# Patient Record
Sex: MALE | Race: OTHER | HISPANIC OR LATINO | URBAN - METROPOLITAN AREA
[De-identification: names, ages, dates, MRNs, and addresses within clinical notes are randomized per-mention and may not be internally consistent; named-entity substitution may affect disease eponyms.]

---

## 2023-05-23 ENCOUNTER — OUTPATIENT (OUTPATIENT)
Dept: OUTPATIENT SERVICES | Facility: HOSPITAL | Age: 23
LOS: 1 days | End: 2023-05-23
Payer: MEDICAID

## 2023-05-23 DIAGNOSIS — R07.9 CHEST PAIN, UNSPECIFIED: ICD-10-CM

## 2023-05-23 PROCEDURE — 71046 X-RAY EXAM CHEST 2 VIEWS: CPT

## 2023-05-23 PROCEDURE — 71046 X-RAY EXAM CHEST 2 VIEWS: CPT | Mod: 26

## 2023-05-24 DIAGNOSIS — R07.9 CHEST PAIN, UNSPECIFIED: ICD-10-CM

## 2023-10-07 ENCOUNTER — NON-APPOINTMENT (OUTPATIENT)
Age: 23
End: 2023-10-07

## 2024-04-30 ENCOUNTER — EMERGENCY (EMERGENCY)
Facility: HOSPITAL | Age: 24
LOS: 0 days | Discharge: HOME | End: 2024-04-30
Attending: EMERGENCY MEDICINE
Payer: MEDICAID

## 2024-04-30 VITALS
TEMPERATURE: 98 F | DIASTOLIC BLOOD PRESSURE: 82 MMHG | SYSTOLIC BLOOD PRESSURE: 148 MMHG | WEIGHT: 156.97 LBS | HEART RATE: 87 BPM | RESPIRATION RATE: 19 BRPM | HEIGHT: 70 IN | OXYGEN SATURATION: 98 %

## 2024-04-30 DIAGNOSIS — J45.909 UNSPECIFIED ASTHMA, UNCOMPLICATED: ICD-10-CM

## 2024-04-30 DIAGNOSIS — R05.8 OTHER SPECIFIED COUGH: ICD-10-CM

## 2024-04-30 DIAGNOSIS — Z91.048 OTHER NONMEDICINAL SUBSTANCE ALLERGY STATUS: ICD-10-CM

## 2024-04-30 PROCEDURE — 94640 AIRWAY INHALATION TREATMENT: CPT

## 2024-04-30 PROCEDURE — 99284 EMERGENCY DEPT VISIT MOD MDM: CPT

## 2024-04-30 PROCEDURE — 99285 EMERGENCY DEPT VISIT HI MDM: CPT | Mod: 25

## 2024-04-30 PROCEDURE — 71046 X-RAY EXAM CHEST 2 VIEWS: CPT | Mod: 26

## 2024-04-30 PROCEDURE — 71046 X-RAY EXAM CHEST 2 VIEWS: CPT

## 2024-04-30 RX ORDER — ALBUTEROL 90 UG/1
2 AEROSOL, METERED ORAL
Qty: 1 | Refills: 0
Start: 2024-04-30 | End: 2024-05-29

## 2024-04-30 RX ORDER — DEXAMETHASONE 0.5 MG/5ML
10 ELIXIR ORAL ONCE
Refills: 0 | Status: COMPLETED | OUTPATIENT
Start: 2024-04-30 | End: 2024-04-30

## 2024-04-30 RX ORDER — ALBUTEROL 90 UG/1
1 AEROSOL, METERED ORAL ONCE
Refills: 0 | Status: COMPLETED | OUTPATIENT
Start: 2024-04-30 | End: 2024-04-30

## 2024-04-30 RX ORDER — IPRATROPIUM/ALBUTEROL SULFATE 18-103MCG
3 AEROSOL WITH ADAPTER (GRAM) INHALATION
Refills: 0 | Status: COMPLETED | OUTPATIENT
Start: 2024-04-30 | End: 2024-04-30

## 2024-04-30 RX ORDER — LORATADINE 10 MG/1
1 TABLET ORAL
Qty: 30 | Refills: 0
Start: 2024-04-30 | End: 2024-05-29

## 2024-04-30 RX ADMIN — ALBUTEROL 1 PUFF(S): 90 AEROSOL, METERED ORAL at 21:22

## 2024-04-30 RX ADMIN — Medication 10 MILLIGRAM(S): at 21:22

## 2024-04-30 RX ADMIN — Medication 3 MILLILITER(S): at 21:35

## 2024-04-30 RX ADMIN — Medication 3 MILLILITER(S): at 21:15

## 2024-04-30 RX ADMIN — Medication 3 MILLILITER(S): at 21:00

## 2024-04-30 NOTE — ED PROVIDER NOTE - OBJECTIVE STATEMENT
23M no pmh p/w 1 mo dry cough. seen by pmd who gave him cefdinir and steroid taper. went to [place] for vacation 1 week ago, had 2 days fever, diarrhea but that resolved. no longer has fever or diarrhea. no cp, sob. no abd pain, nv. no hx asthma. nonsmoker. states tested neg for flu and covid. symptoms started 1 mo ago w cough congestion body aches but now only has cough.

## 2024-04-30 NOTE — ED ADULT NURSE NOTE - NS ED NURSE LEVEL OF CONSCIOUSNESS SPEECH
[place] Border Text: The closure involved the vermilion border. Speaking Coherently/Age appropriate

## 2024-04-30 NOTE — ED PROVIDER NOTE - CLINICAL SUMMARY MEDICAL DECISION MAKING FREE TEXT BOX
pt feels better, LCTAB on re-eval, dc home w albuterol inhaler prn, prednisone x 4 more days, claritin daily, f/u pmd/pulmonary advised 1-2 weeks, strict return precautions provided

## 2024-04-30 NOTE — ED PROVIDER NOTE - NSFOLLOWUPINSTRUCTIONS_ED_ALL_ED_FT
Our Emergency Department Referral Coordinators will be reaching out to you in the next 24-48 hours from 9:00am to 5:00pm to schedule a follow up appointment. Please expect a phone call from the hospital in that time frame. If you do not receive a call or if you have any questions or concerns, you can reach them at   [phone].     Wheezing    WHAT YOU NEED TO KNOW:    Wheezing happens when air flows through a narrowed airway. Wheezing can happen when you breathe in, breathe out, or both. Wheezes may sound like a whistle, squeal, groan, or creak. Wheezes may also sound musical or high-pitched. Wheezing cannot be stopped by coughing. Asthma, allergies, or infection are the most common causes of wheezing. A foreign body, asthma, extra mucus, or smoking can also cause wheezing.    DISCHARGE INSTRUCTIONS:    Call 911 if:     You have sudden trouble breathing.      Your throat feels like it is swelling or feels tight.      You are dizzy, lightheaded, confused, or feel faint.      You have chest pain or tightness.    Return to the emergency department if:     You have shortness of breath.       You are coughing up blood.      You have chest pain.     Contact your healthcare provider if:     You have a fever.      Your wheezing does not get better or it gets worse.      You have questions or concerns about your condition or care.    Medicines:     Medicines decrease inflammation, open airways, and make it easier to breathe.       Take your medicine as directed. Contact your healthcare provider if you think your medicine is not helping or if you have side effects. Tell him of her if you are allergic to any medicine. Keep a list of the medicines, vitamins, and herbs you take. Include the amounts, and when and why you take them. Bring the list or the pill bottles to follow-up visits. Carry your medicine list with you in case of an emergency.    Follow up with your healthcare provider as directed: You may be referred to a specialist. Write down your questions so you remember to ask them during your visits.     Manage your symptoms:     Avoid allergy triggers, such as animals, grass, pollen, or dust.      Return to your usual activity as directed. You may need to limit certain activities until you follow up with your healthcare provider or your symptoms improve. Your child may need to avoid sports until his symptoms improve.      Take deep breaths and cough several times a day. This will decrease your risk for a lung infection and help decrease wheezing. Take a deep breath and hold it for as long as you can. Let the air out and then cough strongly. Deep breaths help open your airway. You may be given an incentive spirometer to help you take deep breaths. Put the plastic piece in your mouth and take a slow, deep breath, then let the air out and cough. Repeat these steps 10 times every hour.       Drink liquids as directed. You may need to drink more liquids than usual to thin your mucus and prevent dehydration. Ask how much liquid to drink each day and which liquids are best for you.       Cough    Coughing is a reflex that clears your throat and your airways. Coughing helps to heal and protect your lungs. It is normal to cough occasionally, but a cough that happens with other symptoms or lasts a long time may be a sign of a condition that needs treatment. Coughing may be caused by infections, asthma or COPD, smoking, postnasal drip, gastroesophageal reflux, as well as other medical conditions. Take medicines only as instructed by your health care provider. Avoid anything that causes you to cough at work or at home including smoking.    SEEK IMMEDIATE MEDICAL CARE IF YOU HAVE THE FOLLOWING SYMPTOMS: coughing up blood, shortness of breath, rapid heart rate, chest pain, unexplained weight loss or night sweats.

## 2024-04-30 NOTE — ED PROVIDER NOTE - EKG/XRAY ADDITIONAL INFORMATION
ED CXR prelim, my independent interpretation - Dr. Agnes Estrada: [No PTX, No infiltrates, No Free air]

## 2024-04-30 NOTE — ED PROVIDER NOTE - CARE PROVIDER_API CALL
Jesse Ac  Internal Medicine  [address] [place] [address]  Phone: [phone]  Fax: [phone]  Follow Up Time: 7-10 Days

## 2024-04-30 NOTE — ED PROVIDER NOTE - PHYSICAL EXAMINATION
VITAL SIGNS: AFebrile, vital signs stable  CONSTITUTIONAL: Well-developed; well-nourished; in no acute distress.  SKIN: Skin exam is warm and dry, no acute rash.  HEAD: Normocephalic; atraumatic.  EYES: Pupils equal round reactive to light, Extraocular movements intact; conjunctiva and sclera clear.  ENT: No nasal discharge; airway clear. Moist mucus membranes.  NECK: Supple; non tender. No rigidity  CARD: Regular rate and rhythm. Normal S1, S2; no murmurs, gallops, or rubs.  RESP: mild scattered expiratory wheezing, poor air entry. +dry cough. no accessory muscle use. speaking in full sentences.   ABD: Abdomen soft; non-tender; non-distended;  no hepatosplenomegaly. No costovertebral angle tenderness.   EXT: Normal ROM. No clubbing, cyanosis or edema. No calf tenderness to palpation.  NEURO: Alert and oriented x 3. No focal deficits.  PSYCH: Cooperative, appropriate.

## 2024-04-30 NOTE — ED PROVIDER NOTE - PATIENT PORTAL LINK FT
You can access the FollowMyHealth Patient Portal offered by [facility] by registering at the following website: [email or web address]. By joining [employer]’s FollowMyHealth portal, you will also be able to view your health information using other applications (apps) compatible with our system.

## 2024-04-30 NOTE — ED ADULT NURSE NOTE - NSFALLUNIVINTERV_ED_ALL_ED
Bed/Stretcher in lowest position, wheels locked, appropriate side rails in place/Call bell, personal items and telephone in reach/Instruct patient to call for assistance before getting out of bed/chair/stretcher/Non-slip footwear applied when patient is off stretcher/[place] to call system/Physically safe environment - no spills, clutter or unnecessary equipment/Purposeful proactive rounding/Room/bathroom lighting operational, light cord in reach

## 2024-05-01 RX ORDER — LORATADINE 10 MG/1
1 TABLET ORAL
Qty: 30 | Refills: 0
Start: 2024-05-01 | End: 2024-05-30

## 2024-05-09 NOTE — CHART NOTE - NSCHARTNOTEFT_GEN_A_CORE
[facility] MRN [id number] - left message 5/1, MV - emailed pulm MV/ as per Pulmo, they left VM 5/3- AC/ pt called us back instead of Pulmo office- sent foloowup email to Pulmo 5/8- AC / Appointment made - VALENTE    Specialty: Pulmonary   Date: May 31, 2024  Time: 8:30 AM  Location: [address] [place] [address]

## 2024-05-31 ENCOUNTER — APPOINTMENT (OUTPATIENT)
Dept: PULMONOLOGY | Facility: CLINIC | Age: 24
End: 2024-05-31
Payer: MEDICAID

## 2024-05-31 ENCOUNTER — NON-APPOINTMENT (OUTPATIENT)
Age: 24
End: 2024-05-31

## 2024-05-31 VITALS
HEART RATE: 105 BPM | RESPIRATION RATE: 14 BRPM | BODY MASS INDEX: 22.62 KG/M2 | DIASTOLIC BLOOD PRESSURE: 62 MMHG | OXYGEN SATURATION: 99 % | HEIGHT: 70 IN | SYSTOLIC BLOOD PRESSURE: 122 MMHG | WEIGHT: 158 LBS

## 2024-05-31 DIAGNOSIS — R05.3 CHRONIC COUGH: ICD-10-CM

## 2024-05-31 DIAGNOSIS — J45.991 COUGH VARIANT ASTHMA: ICD-10-CM

## 2024-05-31 DIAGNOSIS — R05.8 OTHER SPECIFIED COUGH: ICD-10-CM

## 2024-05-31 PROBLEM — Z00.00 ENCOUNTER FOR PREVENTIVE HEALTH EXAMINATION: Status: ACTIVE | Noted: 2024-05-31

## 2024-05-31 PROCEDURE — 99204 OFFICE O/P NEW MOD 45 MIN: CPT | Mod: 25

## 2024-05-31 PROCEDURE — 94010 BREATHING CAPACITY TEST: CPT

## 2024-05-31 RX ORDER — FLUTICASONE PROPIONATE 50 UG/1
50 SPRAY, METERED NASAL DAILY
Qty: 1 | Refills: 11 | Status: ACTIVE | COMMUNITY
Start: 2024-05-31 | End: 1900-01-01

## 2024-05-31 RX ORDER — BUDESONIDE AND FORMOTEROL FUMARATE DIHYDRATE 80; 4.5 UG/1; UG/1
80-4.5 AEROSOL RESPIRATORY (INHALATION) TWICE DAILY
Qty: 1 | Refills: 1 | Status: ACTIVE | COMMUNITY
Start: 2024-05-31 | End: 1900-01-01

## 2024-05-31 NOTE — REVIEW OF SYSTEMS
Pt ambulatory to ED for reports of sore throat x 1 week. Pt concerned for strep throat stating she saw white spots on the left side of her throat. [Negative] : Endocrine

## 2024-05-31 NOTE — RESULTS/DATA
[TextEntry] : ED records from April 2024 reviewed.  Chest x-ray performed 5/1 images and radiologist read reviewed, by my read demonstrating no acute intrathoracic pathology.

## 2024-05-31 NOTE — HISTORY OF PRESENT ILLNESS
[Never] : never [TextBox_4] : Mr. JEANETTE MARLEY is a 23 year man without significant medical history presenting today to the clinic for follow-up of severe cough going to the ED. He was referred by the ED for recent severe cough.  Works as a , started getting severe cough due to dry throat.  Symptoms started in early April right after a regular illness.  He was given antibiotics and felt better, but the cough.  The cough got better and then started to worsen to the point he felt SOB and was waking up from sleep.  Today, symptoms are much improved, but still having cough coming from the chest. Denies wheezing or chest tightness  Occupational Hx:  at a restaurant

## 2024-07-22 ENCOUNTER — APPOINTMENT (OUTPATIENT)
Dept: PULMONOLOGY | Facility: CLINIC | Age: 24
End: 2024-07-22